# Patient Record
Sex: MALE | Race: AMERICAN INDIAN OR ALASKA NATIVE
[De-identification: names, ages, dates, MRNs, and addresses within clinical notes are randomized per-mention and may not be internally consistent; named-entity substitution may affect disease eponyms.]

---

## 2018-03-04 ENCOUNTER — HOSPITAL ENCOUNTER (EMERGENCY)
Dept: HOSPITAL 5 - ED | Age: 38
Discharge: HOME | End: 2018-03-04
Payer: SELF-PAY

## 2018-03-04 VITALS — SYSTOLIC BLOOD PRESSURE: 121 MMHG | DIASTOLIC BLOOD PRESSURE: 72 MMHG
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
## 2018-03-04 DIAGNOSIS — S61.411A: Primary | ICD-10-CM
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
## 2018-03-04 PROCEDURE — 99283 EMERGENCY DEPT VISIT LOW MDM: CPT

## 2018-03-04 PROCEDURE — 90715 TDAP VACCINE 7 YRS/> IM: CPT

## 2018-03-04 PROCEDURE — 90471 IMMUNIZATION ADMIN: CPT

## 2018-03-04 NOTE — XRAY REPORT
FINAL REPORT



EXAM:  XR HAND 3+V RT



HISTORY:  laceration between the 4th and 5th digits of the right

hand



TECHNIQUE:  AP, lateral, and oblique views of the right hand



PRIORS:  None.



FINDINGS:  

There is no evidence for acute fracture or dislocation. No soft

tissue swelling or radiopaque foreign bodies are seen. Bony

mineralization is normal and joint spaces are maintained.



IMPRESSION:  

No acute bony or soft tissue abnormality noted.

## 2018-03-04 NOTE — EMERGENCY DEPARTMENT REPORT
ED Laceration HPI





- HPI


Chief Complaint: Wound/Laceration


Stated Complaint: LACERATION TO RIGHT HAND


Time Seen by Provider: 03/04/18 16:17


Occurred When: Today


Location: Upper Extremity


Tetanus Status: Not up to Date


Laceration Symptoms: Yes Pain, No Foreign Body Sensation, No Numbness, No 

Weakness


Other History: This is a 37-year-old male nontoxic, well nourished in appearance

, no acute signs of distress presents to the ED with c/o of right finger 

laceration that occurred about 2 hours ago.  They stated he was playing with a 

knife which cause a laceration.  Patient is up to date with tetanus.  Patient 

denies any numbness, tingling, fever, chills, nausea, vomiting, chest pain 

shortness breath.





ED Review of Systems


ROS: 


Stated complaint: LACERATION TO RIGHT HAND


Other details as noted in HPI





Constitutional: denies: chills, fever


Eyes: denies: eye pain, eye discharge, vision change


ENT: denies: ear pain, throat pain


Respiratory: denies: cough, shortness of breath, wheezing


Cardiovascular: denies: chest pain, palpitations


Endocrine: no symptoms reported


Gastrointestinal: denies: abdominal pain, nausea, diarrhea


Genitourinary: denies: urgency, dysuria


Musculoskeletal: denies: back pain, joint swelling, arthralgia


Skin: denies: rash, lesions


Neurological: denies: headache, weakness, paresthesias


Psychiatric: denies: anxiety, depression


Hematological/Lymphatic: denies: easy bleeding, easy bruising





ED Past Medical Hx





- Past Medical History


Previous Medical History?: No





- Social History


Smoking Status: Never Smoker


Substance Use Type: Marijuana





- Medications


Home Medications: 


 Home Medications











 Medication  Instructions  Recorded  Confirmed  Last Taken  Type


 


Sulfamethoxazole/Trimethoprim 1 each PO BID #14 tablet 03/04/18  Unknown Rx





[Bactrim DS TAB]     


 


traMADol [Ultram] 50 mg PO Q6HR PRN #12 tablet 03/04/18  Unknown Rx














Laceration Physical Exam





- Exam


General: 


Vital signs noted. No distress. Alert and acting appropriately.





GENERAL: The patient is a well-developed, well-nourished in no apparent 

distress. Patient is alert and acting appropriately for age.  Alert and 

oriented 3, no apparent distress, normal gait, atraumatic.





HEENT: Head is normocephalic and atraumatic. PERRL, Extraocular muscles are 

intact. Pupils are equal, round, and reactive to light and accommodation. Nares 

appeared normal. Mouth is well hydrated and without lesions. Mucous membranes 

are moist. Posterior pharynx clear of any exudate or lesions.  Mouth is well 

hydrated and without lesions.  Tonsils not erythematous or swollen.  Uvula 

midline.  Tongue elevated.  Mucous members are moist.  Posterior pharynx clear, 

no exudate or lesions.  Patent airways.


 


NECK: Supple. No carotid bruits. No lymphadenopathy or thyromegaly.nontender. 

No meningitic signs are noted.


 


LUNGS: Clear to auscultation. Non labor breathing. No intercostal retractions.  

Symmetrical with respiration, no wheezing, no rales, or crackles.


 


HEART: Regular rate and rhythm without murmur, rubs or gallops. No 

reproducible.  S1, S2 present, regular rate and rhythm without murmur, no rubs, 

no gallops.


 


ABDOMEN: Soft, nontender, and nondistended.  Positive bowel sounds.  No 

hepatosplenomegaly was noted. No guarding or rebound tenderness, negative 

epigastric bruit. Negative psoas sign, negative velazquez sign, negative McBurneys 

sign


 


EXTREMITIES: Without any cyanosis, clubbing, rash, lesions or edema. Peripheral 

pulses intact. Capillary refill less than 2 seconds.  Full range of motion 

bilaterally.


 


NEUROLOGIC: Cranial nerves II through XII are grossly intact. Alert and 

oriented x 3. Normal gait. Symmetrical strength and sensation. Reflexes 2+ 

throughout. Cerebellar testing normal. GCS score of 15.


 


PSYCHIATRIC: Normal affect with no suicidal or homicidal ideations.





Skin: 2 cm superficial laceration in between the fourth and fifth digit.  

Bleeding under control.  No pus or drainage noted.  No swelling.


Wound Length (cm): 2


Laceration Location: Upper Extremity


Laceration Exam: Yes Normal Distal CMS, No Foreign Body, No Exposed Tendon, 

Vessel, or Nerve, No Tendon Injury





ED Course


 Vital Signs











  03/04/18





  15:48


 


Temperature 98.8 F


 


Pulse Rate 88


 


Blood Pressure 134/85


 


O2 Sat by Pulse 96





Oximetry 














- Reevaluation(s)


Reevaluation #1: 





03/04/18 17:01


Patient is speaking in full sentences with no signs of distress noted.





- Laceration /Wound Repair


  ** Right Finger


Wound Location: upper extremity (in between fourth and fifth digit)


Wound Length (cm): 2


Wound's Depth, Shape: superficial, linear


Wound Explored: clean


Irrigated w/ Saline (ccs): 500


Betadine Prep?: Yes


Anesthesia: 0.5% Sensorcaine


Volume Anesthetic (ccs): 3


Wound Repaired With: sutures


Suture Size/Type: 5:0, proline


Number of Sutures: 2


Layer Closure?: No


Sterile Dressing Applied?: Yes


Progress: 





Under sterile field, I used Betadine to clean the area.  I then used 40 mL of 

normal saline to flush the area.  I then used 0.5% Marcaine plain and injected 

3 mL to the wound.  I then used a 5-0 Prolene to suture the laceration.  Number 

of stitches 2.  I then applied a sterile 4 x 4 with tape.  Minimal bleeding 

noted but is under control.  Patient tolerated procedure well with no signs of 

distress.





ED Medical Decision Making





- Medical Decision Making





This is a 37-year-old male a/o x3 that presents with laceration.  Patient is 

stable and was examined by me.  Laceration has successfully been sutured and 

patient was instructed to return in 10 days for suture removal.  Patient 

tolerated procedure well.  Patient was instructed not to operate any machinery 

after discharge due to drowiness of ultram as patient stated his girlfriend 

which is at bedside will drive the patient home. Xray within normal limits and 

dictated by radiologist. Patient received Tetanus booster in the eD.  Patient 

is discharged with Bactrim and Ultram for pain.  Patient was instructed and 

educated on proper wound care.  Patient was also instructed to Follow-up with a 

primary care doctor in 3-5 days or if symptoms worsen and continue return to 

emergency room as soon as possible.  At time of discharge, the patient does not 

seem toxic or ill in appearance.  No acute signs of distress noted.  Patient 

agrees to discharge treatment plan of care.  No further questions noted by the 

patient.


Critical care attestation.: 


If time is entered above; I have spent that time in minutes in the direct care 

of this critically ill patient, excluding procedure time.








ED Disposition


Clinical Impression: 


 Laceration





Disposition: DC-01 TO HOME OR SELFCARE


Is pt being admited?: No


Does the pt Need Aspirin: No


Condition: Stable


Instructions:  Laceration (ED), Suture Care (ED), Sulfamethoxazole/Trimethoprim 

(By mouth), Tramadol (By mouth)


Additional Instructions: 


Follow-up with a primary care doctor in 3-5 days or if symptoms worsen and 

continue return to emergency room as soon as possible. 


Return in 10 days for suture removal.


Do not operate any machinery taking Ultram due to drowsiness.


Prescriptions: 


Sulfamethoxazole/Trimethoprim [Bactrim DS TAB] 1 each PO BID #14 tablet


traMADol [Ultram] 50 mg PO Q6HR PRN #12 tablet


 PRN Reason: Pain


Referrals: 


PRIMARY CARE,MD [Primary Care Provider] - 3-5 Days


ELLIOTT JAMES MD [Staff Physician] - 3-5 Days


[facility] [Outside] - 3-5 Days


[facility] [Outside] - 3-5 Days


Forms:  Work/School Release Form(ED)